# Patient Record
Sex: FEMALE | Employment: FULL TIME | URBAN - METROPOLITAN AREA
[De-identification: names, ages, dates, MRNs, and addresses within clinical notes are randomized per-mention and may not be internally consistent; named-entity substitution may affect disease eponyms.]

---

## 2020-12-14 ENCOUNTER — OFFICE VISIT (OUTPATIENT)
Dept: FAMILY MEDICINE CLINIC | Facility: CLINIC | Age: 50
End: 2020-12-14
Payer: COMMERCIAL

## 2020-12-14 VITALS
HEART RATE: 94 BPM | WEIGHT: 181 LBS | SYSTOLIC BLOOD PRESSURE: 178 MMHG | BODY MASS INDEX: 38.52 KG/M2 | DIASTOLIC BLOOD PRESSURE: 102 MMHG | TEMPERATURE: 98 F | RESPIRATION RATE: 16 BRPM | HEIGHT: 57.5 IN

## 2020-12-14 DIAGNOSIS — K29.50 CHRONIC GASTRITIS WITHOUT BLEEDING, UNSPECIFIED GASTRITIS TYPE: ICD-10-CM

## 2020-12-14 DIAGNOSIS — Z12.31 ENCOUNTER FOR SCREENING MAMMOGRAM FOR MALIGNANT NEOPLASM OF BREAST: ICD-10-CM

## 2020-12-14 DIAGNOSIS — R10.821 RIGHT UPPER QUADRANT ABDOMINAL TENDERNESS WITH REBOUND TENDERNESS: ICD-10-CM

## 2020-12-14 DIAGNOSIS — I10 ESSENTIAL HYPERTENSION: Primary | ICD-10-CM

## 2020-12-14 DIAGNOSIS — Z00.00 LABORATORY EXAMINATION ORDERED AS PART OF A ROUTINE GENERAL MEDICAL EXAMINATION: ICD-10-CM

## 2020-12-14 DIAGNOSIS — R10.31 RIGHT LOWER QUADRANT ABDOMINAL PAIN: ICD-10-CM

## 2020-12-14 PROCEDURE — 81001 URINALYSIS AUTO W/SCOPE: CPT | Performed by: FAMILY MEDICINE

## 2020-12-14 PROCEDURE — 99215 OFFICE O/P EST HI 40 MIN: CPT | Performed by: FAMILY MEDICINE

## 2020-12-14 PROCEDURE — 3080F DIAST BP >= 90 MM HG: CPT | Performed by: FAMILY MEDICINE

## 2020-12-14 PROCEDURE — 3008F BODY MASS INDEX DOCD: CPT | Performed by: FAMILY MEDICINE

## 2020-12-14 PROCEDURE — 3077F SYST BP >= 140 MM HG: CPT | Performed by: FAMILY MEDICINE

## 2020-12-14 RX ORDER — VALSARTAN AND HYDROCHLOROTHIAZIDE 160; 12.5 MG/1; MG/1
1 TABLET, FILM COATED ORAL DAILY
Qty: 90 TABLET | Refills: 3 | Status: SHIPPED | OUTPATIENT
Start: 2020-12-14 | End: 2022-01-06

## 2020-12-14 RX ORDER — OMEPRAZOLE 40 MG/1
40 CAPSULE, DELAYED RELEASE ORAL DAILY
Qty: 90 CAPSULE | Refills: 3 | Status: SHIPPED | OUTPATIENT
Start: 2020-12-14 | End: 2022-01-06

## 2020-12-14 NOTE — PATIENT INSTRUCTIONS
What Is High Blood Pressure? High blood pressure (hypertension) is known as the “silent killer.” This is because most of the time it doesn’t cause symptoms. In fact, many people don’t know they have it until other problems develop.  In most cases, high Blood pressure is categorized as normal, elevated, or stage 1 or stage 2 high blood pressure:  · Normal blood pressure is systolic of less than 280 and diastolic of less than 80 (120/80)  · Elevated blood pressure is systolic of 740 to 065 and diastolic le · Reduce sodium. Reducing sodium in your diet reduces fluid retention. Fluid retention caused by too much salt increases blood volume and blood pressure.  The American Heart Association [facility]) advises an \"ideal\" amount of sodium: no more than 1,500 mg a da © 4559-6747 [address]. [address] [facility] [place], [address] [place]. All rights reserved. This information is not intended as a substitute for professional medical care. Always follow your healthcare professional's instructions.         What Is ? Eye Disease: Hypertension can damage the very small blood vessels in the retina. ? Bleeding from the aorta, the large blood vessel that supplies blood to the abdomen, pelvis, and legs   ? Heart failure   ? Poor blood supply to the legs  ?  Erectile Dysfu Dietary sodium reduction Reduce dietary sodium intake to <= 100 mmol per day (2.4 g sodium or 6 g sodium chloride) 2-8 mmHg   Aerobic physical activity Regular aerobic physical activity (e.g., brisk walking, light jogging, cycling, swimming, etc.) for a go · La grasa que haya en stallings dieta hará que la vesícula se contraiga y esto le aumentará el dolor. Por lo tanto, evite comer alimentos con grasa (elidia lácteos enteros, comidas fritas y shantanu grasas) samara al [employer]   · Si tiene sobrepeso, consulte · Siga darin dieta con un bajo contenido de grasas. Los alimentos grasos hacen que la vesícula biliar libere bilis para ayudar a digerir las Cerrillos. Kacie si tiene cálculos biliares, esto suele causar dolor.   ? Chitra las etiquetas de los ingredientes de los Eleno Forrest © 2013-7019 [address]. 1407 [facility] [place], [address] [facility]. Todos los derechos reservados. Esta información no pretende sustituir la atención médica profesional. Sólo stallings médico puede diagnosticar y tratar un problema de mikey. · Aprenda a medirse la presión arterial. Lleve un registro de brady mediciones. Pídale a stallings médico que le indique con qué resultados debería pedir asistencia médica. · [employer] medicamentos para la presión arterial exactamente elidia le indiquen.  No omita arturo · Siga el plan de comidas Enfoques dietarios para detener la hipertensión (Dietary Approaches to Stop Hypertension, DASH). Kaye plan recomienda consumir verduras, frutas, granos enteros y otros alimentos saludables para el corazón.   · Coma alimentos ricos · Debilidad, hormigueo o entumecimiento en los músculos de la rajesh, los brazos o las piernas  · Cambios en la vista  · Presión arterial medida en stallings casa mayor a 180/110  © 9547-1741 [address]. [address] [facility] [place] [address] [place].  Inna Morrison · Media taza de jugo de verduras  Las mejores opciones: Verduras frescas o congeladas preparadas sin sal ni grasa agregada.    Aniyah [place]  Porciones: 4 a 5 cada día  Juhi porción es:  · 1 fruta de tamaño mediano  · Un cuarto de taza de fruta seca  · Media taza d · 1 cucharada de azúcar, jarabe de jules o miel  · 1 cucharada de mermelada o jalea de fruta  · Media onza de caramelos de goma o “jelly beans” (aproximadamente 15)  · 1 taza de limonada  Las mejores opciones: La fruta seca puede ser un cornell apetitoso.  Sarah

## 2020-12-14 NOTE — PROGRESS NOTES
HPI:   Subjective Patient presents with:  Abdominal Pain: RLQ pain for about a week now, she states that she does get SOB when she feels the pain. Her mother passed away from stomach cancer.   Blood Pressure: She states she took BP medication for one zelalem following portions of the patient's history were reviewed in this encounter and updated as appropriate: Chief Complaint Reviewed and Verified  Nursing Notes Reviewed and   Verified  Tobacco Reviewed  Allergies Reviewed  Medications Reviewed    Problem List Psychiatric/Behavioral: The patient is not nervous/anxious. No depression.       EXAM:   BP (!) 178/102   Pulse 94   Temp 98.2 °F (36.8 °C) (Temporal)   Resp 16   Ht 4' 9.5\" (1.461 m)   Wt 181 lb (82.1 kg)   LMP 11/18/2020   BMI 38.49 kg/m²  Estimated adelina Restriction  Increased Physical Activity/ Get Regular Aerobic Exercise. Weight loss.   DASH Eating Plan  Patient Education: Reviewed risks of hypertension and principles of   treatment.  - TSH W REFLEX TO FREE T4; Future  - URINALYSIS, AUTO, W/SCOPE  - Suzan pain  -     HELICOBACTER PYLORI BREATH TEST, ADULT (>17); Future    Chronic gastritis without bleeding, unspecified gastritis type  -     Omeprazole 40 MG Oral Capsule Delayed Release; Take 1 capsule (40 mg total) by mouth daily.     Right upper quadrant ab

## 2020-12-22 ENCOUNTER — HOSPITAL ENCOUNTER (OUTPATIENT)
Dept: ULTRASOUND IMAGING | Facility: HOSPITAL | Age: 50
Discharge: HOME | End: 2020-12-22
Attending: FAMILY MEDICINE
Payer: COMMERCIAL

## 2020-12-22 ENCOUNTER — LAB ENCOUNTER (OUTPATIENT)
Dept: LAB | Facility: HOSPITAL | Age: 50
End: 2020-12-22
Attending: FAMILY MEDICINE
Payer: COMMERCIAL

## 2020-12-22 DIAGNOSIS — Z00.00 LABORATORY EXAMINATION ORDERED AS PART OF A ROUTINE GENERAL MEDICAL EXAMINATION: ICD-10-CM

## 2020-12-22 DIAGNOSIS — I10 ESSENTIAL HYPERTENSION: ICD-10-CM

## 2020-12-22 DIAGNOSIS — R10.821 RIGHT UPPER QUADRANT ABDOMINAL TENDERNESS WITH REBOUND TENDERNESS: ICD-10-CM

## 2020-12-22 DIAGNOSIS — R10.31 RIGHT LOWER QUADRANT ABDOMINAL PAIN: ICD-10-CM

## 2020-12-22 PROCEDURE — 80061 LIPID PANEL: CPT

## 2020-12-22 PROCEDURE — 83036 HEMOGLOBIN GLYCOSYLATED A1C: CPT

## 2020-12-22 PROCEDURE — 84443 ASSAY THYROID STIM HORMONE: CPT

## 2020-12-22 PROCEDURE — 80053 COMPREHEN METABOLIC PANEL: CPT

## 2020-12-22 PROCEDURE — 84439 ASSAY OF FREE THYROXINE: CPT

## 2020-12-22 PROCEDURE — 85025 COMPLETE CBC W/AUTO DIFF WBC: CPT

## 2020-12-22 PROCEDURE — 36415 COLL VENOUS BLD VENIPUNCTURE: CPT

## 2020-12-22 PROCEDURE — 83013 H PYLORI (C-13) BREATH: CPT

## 2020-12-22 PROCEDURE — 76700 US EXAM ABDOM COMPLETE: CPT | Performed by: FAMILY MEDICINE

## 2020-12-28 ENCOUNTER — OFFICE VISIT (OUTPATIENT)
Dept: FAMILY MEDICINE CLINIC | Facility: CLINIC | Age: 50
End: 2020-12-28
Payer: COMMERCIAL

## 2020-12-28 VITALS
SYSTOLIC BLOOD PRESSURE: 122 MMHG | DIASTOLIC BLOOD PRESSURE: 80 MMHG | TEMPERATURE: 97 F | RESPIRATION RATE: 16 BRPM | HEIGHT: 57.5 IN | WEIGHT: 181.13 LBS | HEART RATE: 67 BPM | BODY MASS INDEX: 38.54 KG/M2

## 2020-12-28 DIAGNOSIS — K80.20 CALCULUS OF GALLBLADDER WITHOUT CHOLECYSTITIS WITHOUT OBSTRUCTION: ICD-10-CM

## 2020-12-28 DIAGNOSIS — Z01.419 WELL WOMAN EXAM WITH ROUTINE GYNECOLOGICAL EXAM: Primary | ICD-10-CM

## 2020-12-28 DIAGNOSIS — Z12.4 PAP SMEAR FOR CERVICAL CANCER SCREENING: ICD-10-CM

## 2020-12-28 DIAGNOSIS — Z12.31 BREAST CANCER SCREENING BY MAMMOGRAM: ICD-10-CM

## 2020-12-28 DIAGNOSIS — Z12.4 CERVICAL CANCER SCREENING: ICD-10-CM

## 2020-12-28 DIAGNOSIS — Z11.51 ENCOUNTER FOR SCREENING FOR HUMAN PAPILLOMAVIRUS (HPV): ICD-10-CM

## 2020-12-28 DIAGNOSIS — E03.8 SUBCLINICAL HYPOTHYROIDISM: ICD-10-CM

## 2020-12-28 DIAGNOSIS — D64.9 LOW HEMOGLOBIN: ICD-10-CM

## 2020-12-28 PROCEDURE — 3074F SYST BP LT 130 MM HG: CPT | Performed by: FAMILY MEDICINE

## 2020-12-28 PROCEDURE — 88175 CYTOPATH C/V AUTO FLUID REDO: CPT | Performed by: FAMILY MEDICINE

## 2020-12-28 PROCEDURE — 3079F DIAST BP 80-89 MM HG: CPT | Performed by: FAMILY MEDICINE

## 2020-12-28 PROCEDURE — 87624 HPV HI-RISK TYP POOLED RSLT: CPT | Performed by: FAMILY MEDICINE

## 2020-12-28 PROCEDURE — 99396 PREV VISIT EST AGE 40-64: CPT | Performed by: FAMILY MEDICINE

## 2020-12-28 PROCEDURE — 3008F BODY MASS INDEX DOCD: CPT | Performed by: FAMILY MEDICINE

## 2020-12-28 NOTE — PATIENT INSTRUCTIONS
Anemia  Anemia is a condition that occurs when your body does not have enough healthy red blood cells (RBCs). RBCs are the parts of your blood that carry oxygen all over your body.  A protein called hemoglobin allows your RBCs to absorb and release oxygen · Blood smear. This test checks the size and shape of your blood cells. To do the test, a drop of your blood is looked at under a microscope. A stain is used to make the blood cells easier to see. · Iron studies.  These tests measure the amount of iron in © 7557-6514 [address]. 1407 [facility] [place], [address] [place]. All rights reserved. This information is not intended as a substitute for professional medical care. Always follow your healthcare professional's instructions.         Norma Hernandez · Swollen belly  · Fever of 100.4ºF (38ºC) or higher, or as directed by your healthcare provider  · Very dark urine, light colored stools, or yellow color of the skin or eyes  · Chest, arm, back, neck or jaw pain  Jaja last reviewed this educational co Treatment for hypothyroidism involves taking thyroid hormone pills daily. These pills replace the hormone your thyroid doesn’t make. You will likely need to take a daily pill for the rest of your life. Tips for taking this medicine are given below.   Home c © 6852-2594 [address]. 1407 [facility] [place], [facility] [place]. All rights reserved. This information is not intended as a substitute for professional medical care. Always follow your healthcare professional's instructions.

## 2020-12-28 NOTE — PROGRESS NOTES
HPI:   Nyla Harden is a 52year old female that presents for well woman exam.     Patient is here for BP follow up at last visit started on diovan and BP normal today. Prediabetes  Last A1c value was 6% done 12/22/2020.  She does not follow a dilatation. Sonographer states negative sonographic Long sign. HISTORY:  History reviewed. No pertinent past medical history. History reviewed. No pertinent surgical history. History reviewed. No pertinent family history.    Social History    Tob [place]:  Normocephalic, atraumatic    Eyes: EOMI, PERRLA, no scleral icterus, conjunctivae clear bilaterally   Ears: External normal. TMs normal without erythema or effusion   Nose: patent, no nasal discharge    Throat:  No tonsillar erythema or exudate. cancer screening by mammogram  - DAPHNE MYRNA 2D+3D SCREENING LEFT (CPT=77067-52/57139); Future    Risks, benefits, and alternatives of current treatment plan discussed in detail. Red flags discussed to RTC or ED . Questions and concerns addressed.  Patient (or

## 2022-01-06 DIAGNOSIS — I10 ESSENTIAL HYPERTENSION: ICD-10-CM

## 2022-01-06 DIAGNOSIS — K29.50 CHRONIC GASTRITIS WITHOUT BLEEDING, UNSPECIFIED GASTRITIS TYPE: ICD-10-CM

## 2022-01-06 RX ORDER — OMEPRAZOLE 40 MG/1
CAPSULE, DELAYED RELEASE ORAL
Qty: 90 CAPSULE | Refills: 3 | Status: SHIPPED | OUTPATIENT
Start: 2022-01-06

## 2022-01-06 RX ORDER — VALSARTAN AND HYDROCHLOROTHIAZIDE 160; 12.5 MG/1; MG/1
1 TABLET, FILM COATED ORAL DAILY
Qty: 90 TABLET | Refills: 3 | Status: SHIPPED | OUTPATIENT
Start: 2022-01-06

## 2023-02-02 ENCOUNTER — TELEPHONE (OUTPATIENT)
Dept: FAMILY MEDICINE CLINIC | Facility: CLINIC | Age: 53
End: 2023-02-02

## 2024-03-13 NOTE — TELEPHONE ENCOUNTER
Please inform patient to set up an appointment with me. I will not be giving any further refills.

## 2024-03-13 NOTE — TELEPHONE ENCOUNTER
Requested Renewals     Name from pharmacy: OMEPRAZOLE 40MG CAPSULES         Will file in chart as: OMEPRAZOLE 40 MG Oral Capsule Delayed Release    Sig: TAKE 1 CAPSULE(40 MG) BY MOUTH DAILY    Disp: 90 capsule    Refills: 3 (Pharmacy requested: Not specified)    Start: 3/12/2024    Class: Normal    Non-formulary For: Chronic gastritis without bleeding, unspecified gastritis type    Last ordered: 1 year ago (2/16/2023) by LUCY Fonseca    Last refill: 11/25/2023    Rx #: [id number]        Name from pharmacy: VALSARTAN/HCTZ 160MG/12.5MG TABLETS         Will file in chart as: VALSARTAN-HYDROCHLOROTHIAZIDE 160-12.5 MG Oral Tab    Sig: Take 1 tablet by mouth daily.    Original sig: TAKE 1 TABLET BY MOUTH DAILY    Disp: 90 tablet    Refills: 3 (Pharmacy requested: Not specified)    Start: 3/12/2024    Class: Normal    Non-formulary For: Essential hypertension    Last ordered: 1 year ago (2/16/2023) by LUCY Fonseca    Last refill: 11/25/2023    Rx #: [id number]    Hypertension Medications Protocol Faierv2903/12/2024 06:24 PM   Protocol Details CMP or BMP in past 12 months    EGFRCR or GFRNAA > 50    Last BP reading less than 140/90    In person appointment or virtual visit in the past 12 mos or appointment in next 3 mos             No future appointments.  LOV: 5/2/23  Last well women exam done 12/28/20    -[employer] message sent to patient to schedule annual exam and to have labs done beforehand.    -medications pended for review.

## 2024-07-10 NOTE — PROGRESS NOTES
CHIEF COMPLAINT:     Chief Complaint   Patient presents with    Referral     Patient here to get an ortho referral for her right wrist, she believes she might have carpal tunnel. Patient works on computers all day, she has started to wear a brace on the right wrist and she says it has been helping.       HPI:   Serenity Kwong is a 53 year old female. Patient's presenting with bilateral wrist pain right greater than left.     Cause - unknown, chronic typing  Onset - years ago but getting worse recently  Location of pain - bilateral wrists right worse than left.   Pain described as sharp at time aching mostly  Pain scale - 4-5/10  Radiation - up wrist to forearm  Pain is aggravated by movement, use and palpation  Pain is alleviated by bracing, rest, positioning, and NSAIDS    Care prior to arrival consisted of rest, NSAID, elevation and ice, with minimal relief.    Current Outpatient Medications   Medication Sig Dispense Refill    Meloxicam 15 MG Oral Tab Take 1 tablet (15 mg total) by mouth daily. 30 tablet 0    OMEPRAZOLE 40 MG Oral Capsule Delayed Release TAKE 1 CAPSULE(40 MG) BY MOUTH DAILY 90 capsule 0    VALSARTAN-HYDROCHLOROTHIAZIDE 160-12.5 MG Oral Tab TAKE 1 TABLET BY MOUTH DAILY 90 tablet 0      History reviewed. No pertinent past medical history.   Social History:  Social History     Socioeconomic History    Marital status: Single   Tobacco Use    Smoking status: Never    Smokeless tobacco: Never   Vaping Use    Vaping status: Never Used   Substance and Sexual Activity    Alcohol use: Yes     Comment: OCCASIONALLY    Drug use: Never        REVIEW OF SYSTEMS:   GENERAL HEALTH: feels well otherwise  SKIN: denies any unusual skin lesions or rashes, did have immediate swelling to area.   RESPIRATORY: denies shortness of breath with exertion  CARDIOVASCULAR: denies chest pain on exertion  GI: denies abdominal pain and denies heartburn  NEURO: denies numbness and tingling sensation.     EXAM:   /82  (BP Location: Left arm, Patient Position: Sitting, Cuff Size: large)   Pulse 78   Temp 97.4 °F (36.3 °C) (Temporal)   Resp 18   Ht 4' 9.5\" (1.461 m)   Wt 211 lb 12.8 oz (96.1 kg)   SpO2 99%   BMI 45.04 kg/m²   GENERAL: well developed, well nourished,in no apparent distress  SKIN: no rashes,no suspicious lesions, skin's intact.   HEENT: atraumatic, normocephalic,ears and throat are clear  NECK: supple,no adenopathy,no bruits  LUNGS: clear to auscultation  CARDIO: RRR without murmur  GI: good BS's,no masses, HSM or tenderness  EXTREMITIES: no cyanosis, clubbing or edema  NEURO: normal sensation distally and normal cap refill distally.  Exam of bilateral wrists   - swelling: minor  - tenderness: minor  - deformity/defect:  none obvious  - crepitus: none  - ecchymosis/bruising: none  - length/size (in cm): none  - Range of motion: normal with minor pain   - radial pulses: 2+  - sensation: intact  - Motor exam/strength/hand : 5/5        ASSESSMENT AND PLAN:   Assessment:   Encounter Diagnosis   Name Primary?    Bilateral hand pain Yes         Plan:  Rest, ice, bracing, NSAID.       Procedures    Ortho Referral - In Network        Follow-up with PCP for worsening symptoms or no improvement  Medication use, dose, and possible side effects discussed.    Meds & Refills for this Visit:  Requested Prescriptions     Signed Prescriptions Disp Refills    Meloxicam 15 MG Oral Tab 30 tablet 0     Sig: Take 1 tablet (15 mg total) by mouth daily.           The patient and parents indicate understanding of these issues and agrees to the plan.  The patient indicates understanding of these issues and agrees to the plan.

## 2024-07-23 NOTE — TELEPHONE ENCOUNTER
Requesting     Disp Refills Start End     OMEPRAZOLE 40 MG Oral Capsule Delayed Release 90 capsule 0 3/13/2024 --    Sig - Route: TAKE 1 CAPSULE(40 MG) BY MOUTH DAILY - Oral    Sent to pharmacy as: Omeprazole 40 MG Oral Capsule Delayed Release        Disp Refills Start End     VALSARTAN-HYDROCHLOROTHIAZIDE 160-12.5 MG Oral Tab 90 tablet 0 3/13/2024 --    Sig - Route: TAKE 1 TABLET BY MOUTH DAILY - Oral    Sent to pharmacy as: Valsartan-hydroCHLOROthiazide 160-12.5 MG Oral Tablet (Diovan HCT)      LOV: 7/8/2024 w/Frank for ortho referral  OV 5/2/2023 for head pain on the right side  OV 2/16/2023 for med f/u  RTC: prn    Last Relevant Labs: 12/22/2020    Filled:     Dispensed Written Strength Quantity Refills Days Supply Provider Pharmacy    OMEPRAZOLE 40MG CAPSULES 03/13/2024 03/13/2024  90 each  [facility]...       Dispensed Written Strength Quantity Refills Days Supply Provider Pharmacy    VALSARTAN/HCTZ 160MG/12.5MG TABLETS 03/13/2024 03/13/2024  90 each  [facility]...     No future appointments.    Rx omeprazole sent per protocol     Please call to schedule patient for overdue annual physical, last on 12/28/2020 and to complete fasting lab work ordered this past February

## 2024-07-24 NOTE — TELEPHONE ENCOUNTER
Sent Aleksandert for patient to call office to schedule Complete Physical Exam and do fasting labs prior to that appointment.

## 2024-12-03 NOTE — TELEPHONE ENCOUNTER
Spoke with daughter Monserrat. She states that patient has been complaining of nausea, dizziness and sweating since 0100. Daughter says patient also has a slight headache. Patient denies chest pain, shoulder pain, numbness, tingling or difficulty breathing. Occasional blurry vision when she wakes up. Daughter states patient denies any fever or cold like symptoms. Has been around people who have had a cold.     Patient has been in and out of sleep today. Easily arousable. Does not have a hard time staying awake. When patient does lay down she feels the room spinning. When patient takes sips of water patient becomes nauseous. Patient sticking to small amounts of rice for food.     Patient has no history of diabetes. Patient does have history of hypertension. Patient has not been taking any blood pressure medications since July. Daughter took patient's bp, 147/66 with a heart rate of 79.    Advised patient and daughter to be seen in office for further evaluation. Advised them to stick to a BRAT diet and increase fluids. Eat a piece of candy in case blood sugar levels may be low. Continue to monitor symptoms and blood pressure. Advised that if symptoms progress or worsen and patient develops difficulty breathing, shortness of breath, worse headache, light headed, lethargic, bp >180/>100, etc to immediately seek medical attention at the ED. Patient and daughter verbalized understanding.      All questions answered. Call the office with questions.    Future Appointments   Date Time Provider Department Center   12/6/2024  1:00 PM Drew Lagos DO [facility] [address] [facility] [address]

## 2024-12-03 NOTE — TELEPHONE ENCOUNTER
Pts daughter calling stating that patient is not feeling well she is experiencing dizziness, nausea, sweets. Monserrat states that this started at 1:00 a.m.     Please advise.     Monserrat wants to know if Patient should be seen in office or taken to ER.        Perineural Invasion (For Histology - Be Specific If Possible): absent

## 2024-12-05 NOTE — TELEPHONE ENCOUNTER
Spoke to daughter, Monserrat. Patient was doing ok the last couple of days. Was eating and drinking a little more. Nausea subsided. Patient woke up with a \"really bad headache\" today. Headache is very bothersome to patient per daughter. Blood pressure has been fluctuating, 186/80s. Denies any numbness or tingling or nausea. Advised daughter to take patient to be further evaluated in ED. Daughter verbalized understanding.    Future Appointments   Date Time Provider Department Center   12/6/2024  1:00 PM Drew Lagos,  [facility] [address] [facility] [address]

## 2024-12-05 NOTE — ED PROVIDER NOTES
Patient Seen in: [facility] [place]      History     Chief Complaint   Patient presents with    HTN    Headache     Stated Complaint: htn since tuesday, headache today    Subjective:   HPI  Patient is a 52 yo F with a history of GERD, HTN who presents to ED for evaluation of headache, elevated BP. Patient reports over past week she has had elevated BP with SBP up to 180s today. She was taking her GERD medication thinking it was for her BP but has mistakenly been taking no BP medications over past 2 months. She reports today she developed progressively worsening frontal headache, not sudden or maximal in onset. She didn't take any meds prior to arrival. Headache is constant. No head injuries. No hx of migraines. Over past week pt has had intermittent episodes of dizziness, mostly with head movements which resolve after a few seconds. No focal weakness, speech changes, numbness or tingling. No CP, n/v or SOB. No fevers, neck stiffness.     Offered , but patient declines and prefers daughter who is at bedside to translate.     Objective:     Past Medical History:    Essential hypertension              History reviewed. No pertinent surgical history.             Social History     Socioeconomic History    Marital status: Single   Tobacco Use    Smoking status: Never    Smokeless tobacco: Never   Vaping Use    Vaping status: Never Used   Substance and Sexual Activity    Alcohol use: Yes     Comment: OCCASIONALLY    Drug use: Never                  Physical Exam     ED Triage Vitals [12/05/24 1412]   BP (!) 187/86   Pulse 84   Resp 18   Temp 97.9 °F (36.6 °C)   Temp src Temporal   SpO2 99 %   O2 Device None (Room air)       Current Vitals:   Vital Signs  BP: 155/90  Pulse: 73  Resp: 17  Temp: 98 °F (36.7 °C)  Temp src: Oral  MAP (mmHg): (!) 117    Oxygen Therapy  SpO2: 98 %  O2 Device: None (Room air)        Physical Exam  Vitals and nursing note reviewed.   Constitutional:        General: She is not in acute distress.     Appearance: She is not ill-appearing.   HENT:      Head: Normocephalic and atraumatic.      Mouth/Throat:      Mouth: Mucous membranes are moist.   Eyes:      Extraocular Movements: Extraocular movements intact.      Pupils: Pupils are equal, round, and reactive to light.   Cardiovascular:      Rate and Rhythm: Normal rate and regular rhythm.   Pulmonary:      Effort: Pulmonary effort is normal.   Abdominal:      General: There is no distension.      Palpations: Abdomen is soft.      Tenderness: There is no abdominal tenderness.   Musculoskeletal:      Cervical back: Normal range of motion and neck supple. No rigidity.      Right lower leg: No edema.      Left lower leg: No edema.   Skin:     General: Skin is warm and dry.      Capillary Refill: Capillary refill takes less than 2 seconds.   Neurological:      General: No focal deficit present.      Mental Status: She is alert.      Comments: 5/5 strength in UE and LE bilaterally  Normal sensation  CN II-XII in tact  No pronator drift       Psychiatric:         Mood and Affect: Mood normal.             ED Course     Labs Reviewed   CBC WITH DIFFERENTIAL WITH PLATELET - Abnormal; Notable for the following components:       Result Value    Immature Platelet Fraction 16.1 (*)     All other components within normal limits   COMP METABOLIC PANEL (14) - Abnormal; Notable for the following components:    Glucose 106 (*)     BUN 8 (*)     Alkaline Phosphatase 114 (*)     All other components within normal limits   TROPONIN I HIGH SENSITIVITY - Normal     EKG    Rate, intervals and axes as noted on EKG Report.  Rate: 79  Rhythm: Sinus Rhythm  Reading: NSR with ventricular rate of 79, no acute ST elevations or depressions, Qtc of 463                    MDM      Patient is a 54 yo F with a history of GERD, HTN who presents to ED for evaluation of headache, elevated BP. Patient is afebrile, hypertensive, satting well on RA. Pt is  nontoxic appearing, neurologically intact. Patient arrives to ED hypertensive, afebrile, satting well on RA. On exam patient is nontoxic appearing. No focal neurologic deficits.     Suspect most likely headache is 2/2 elevated BP or tension headache vs. Migraine. Differential also includes ICH. Low suspicion for CVA given no neurologic deficits. Headache was not sudden or maximal in onset so do not suspect SAH. Low suspicion for meningitis given no fevers, nuchal rigidity, and pt is nontoxic. No temporal TTP or jaw claudication to suggest temporal arteritis.     Will obtain labs, CTA head/neck, CXR. EKG is nonischemic. Will give IV migraine cocktail and re-evaluate.       ED Course as of 12/06/24 0016  ------------------------------------------------------------  Time: 12/05 1510  Comment: CBC, CMP unremarkable. Trop negative. EKG nonischemic.   ------------------------------------------------------------  Time: 12/05 1511  Comment: CXR independently reviewed shows no acute process.   ------------------------------------------------------------  Time: 12/05 1632  Comment: CONCLUSION:       1. No acute intracranial abnormality identified.      2. No evidence of intracranial aneurysm, flow-limiting stenosis, or focal arterial occlusion.      3. No evidence of occlusion, dissection, or flow-limiting stenosis in the cervical vertebral or carotid arteries. No evidence of hemodynamically significant carotid stenosis by NASCET criteria.     ------------------------------------------------------------  Time: 12/05 9822  Comment: Pt given amlodipine as we don't have her medication in our ED. Her BP improved and headache resolved. Work up overall unremarkable.     Refilled pt's home BP med. Pt has appt with PCP tomorrow. Recommended continuing BP log at home.     Patient is stable for discharge home with close PCP f/u. Discussed strict return precautions and supportive care. Patient verbalized understanding and agreement of  plan.              Medical Decision Making  Amount and/or Complexity of Data Reviewed  Independent Historian:      Details: Daughter  External Data Reviewed: notes.     Details: Communication notes from PCP office this week  Labs: ordered. Decision-making details documented in ED Course.  Radiology: ordered and independent interpretation performed. Decision-making details documented in ED Course.  ECG/medicine tests: ordered and independent interpretation performed. Decision-making details documented in ED Course.    Risk  Prescription drug management.        Disposition and Plan     Clinical Impression:  1. Acute nonintractable headache, unspecified headache type    2. Hypertension, unspecified type         Disposition:  Discharge  12/5/2024  4:46 pm    Follow-up:  Drew Lagos DO  [address] [facility] [address]  [facility] [address]  [phone]    Schedule an appointment as soon as possible for a visit in 1 day(s)            Medications Prescribed:  Discharge Medication List as of 12/5/2024  4:47 PM        START taking these medications    Details   !! valsartan-hydroCHLOROthiazide 160-12.5 MG Oral Tab Take 1 tablet by mouth daily for 7 days., Normal, Disp-7 tablet, R-0       !! - Potential duplicate medications found. Please discuss with provider.              Supplementary Documentation:

## 2024-12-05 NOTE — DISCHARGE INSTRUCTIONS
You were seen in the Emergency Room. Your labs and CT scan were reassuring.     - Resume taking your home blood pressure medications  - Return to the ER if you develop severe headache, vomiting, focal weakness, numbness, or any other new concerns or worsening symptoms  - You will need a refill of your blood pressure medication from your primary care doctor  - Continue to keep a blood pressure log at home    Follow up closely with your primary care physician.

## 2024-12-05 NOTE — ED INITIAL ASSESSMENT (HPI)
Patient complains of HTN for approx. 2 days. Highest b/p 160 systolic. Patient states that today she developed a right sided headache and dizziness approx. 5 hours PTA. Patient is on medication for HTN, but has not taken it in 2 months due to prescription not being renewed. Negative stroke scale in triage. Patient ambulatory to ER.

## 2024-12-06 NOTE — PATIENT INSTRUCTIONS
Monitor your blood pressure once daily at the same time every morning. Please send us a Giveo message with your readings over the next few days. Follow up in 1 week via Giveo or in the office for blood pressure check.   Reduce your salt intake.   If you experience, headache, vision change, vomiting, BP >180/100 then go to ER.

## 2024-12-06 NOTE — PROGRESS NOTES
HPI:   Subjective   Chief Complaint   Patient presents with    ER F/U     Patient states she feels dizzy when moving her head x 3 days - Seen in ER       Serenity CHARLENE Kwong is a 53 year old female who presents for follow-up of elevated blood pressure. She is not exercising and is adherent to a low-salt diet. Blood pressure is not well controlled at home.   agents associated with hypertension: none.     Patient was seen in the emergency room yesterday.  She has a history of GERD and hypertension.  She was seen for headache, elevated blood pressure.  Blood pressure at home systolic was in the 180s.  She thought she was taking her blood pressure medication however she was actually not and instead taking her GERD medication- this occurred for two months.  She developed a frontal headache that was worsening in nature went to the ER.  Found to have systolic in the 180s.  She did not have any neurological deficits noted on examination.  Patient had an EKG done that was normal sinus rhythm with no acute ischemic changes that were identifiable.  CTA head and neck was completed in the emergency room-no acute abnormality identified.  No signs of aneurysm.  No signs of occlusion.  Chest x-ray is normal. In the ED she got norvasc.     Today a mammogram has been ordered and Cologuard has been ordered as well.    Today she feels dizzy,  headache is better  Nasuea resolved.   No vomiting.   She had blurry vision yesterday now resolved.         Hypertension Labs   Lab Results   Component Value Date    CREATSERUM 0.81 12/05/2024    K 3.8 12/05/2024    CHOLEST 218 (H) 12/05/2024    CHOLEST 182 12/22/2020    HDL 44 12/05/2024    HDL 37 (L) 12/22/2020     (H) 12/05/2024     (H) 12/22/2020    TRIG 252 (H) 12/05/2024    TRIG 177 (H) 12/22/2020        Wt Readings from Last 3 Encounters:   12/06/24 208 lb (94.3 kg)   12/05/24 180 lb (81.6 kg)   07/08/24 211 lb 12.8 oz (96.1 kg)     BP Readings from Last 3 Encounters:    12/06/24 144/80   12/05/24 155/90   07/08/24 132/82          Past History:   The following portions of the patient's history were reviewed in this encounter and updated as appropriate:  Chief Complaint Reviewed and Verified  Nursing Notes Reviewed and   Verified  Tobacco Reviewed  Allergies Reviewed  Medications Reviewed    Problem List Reviewed  Medical History Reviewed  Surgical History   Reviewed  Family History Reviewed  Social History Reviewed          She  has a past medical history of Essential hypertension.   Her family history includes Cancer in her mother; Stroke in her father.   She  reports that she has never smoked. She has never been exposed to tobacco smoke. She has never used smokeless tobacco. She reports that she does not currently use alcohol. She reports that she does not use drugs.     She has No Known Allergies.     Current Outpatient Medications on File Prior to Visit   Medication Sig    OMEPRAZOLE 40 MG Oral Capsule Delayed Release TAKE 1 CAPSULE(40 MG) BY MOUTH DAILY     No current facility-administered medications on file prior to visit.     CBC  (most recent labs)   Lab Results   Component Value Date/Time    WBC 8.0 12/05/2024 02:34 PM    HGB 13.6 12/05/2024 02:34 PM    HCT 41.0 12/05/2024 02:34 PM    .0 12/05/2024 02:34 PM         CMP  (most recent labs)   Lab Results   Component Value Date/Time     (H) 12/05/2024 02:34 PM    BUN 8 (L) 12/05/2024 02:34 PM    CREATSERUM 0.81 12/05/2024 02:34 PM    GFRNAA 92 12/22/2020 06:02 AM    GFRAA 106 12/22/2020 06:02 AM    EGFRCR 88 12/05/2024 02:34 PM    CA 9.9 12/05/2024 02:34 PM    ALKPHO 114 (H) 12/05/2024 02:34 PM    AST 18 12/05/2024 02:34 PM    ALT 20 12/05/2024 02:34 PM    BILT 0.4 12/05/2024 02:34 PM    TP 7.7 12/05/2024 02:34 PM    ALB 4.8 12/05/2024 02:34 PM     12/05/2024 02:34 PM    K 3.8 12/05/2024 02:34 PM     12/05/2024 02:34 PM    CO2 30.0 12/05/2024 02:34 PM           Lipids  (most recent labs)    Lab Results   Component Value Date/Time    CHOLEST 218 (H) 12/05/2024 02:34 PM    TRIG 252 (H) 12/05/2024 02:34 PM    HDL 44 12/05/2024 02:34 PM     (H) 12/05/2024 02:34 PM    NONHDLC 174 (H) 12/05/2024 02:34 PM           REVIEW OF SYSTEMS:   GENERAL HEALTH: feels well otherwise  Pertinent items are noted in HPI.  A comprehensive review of systems was negative except as noted in HPI.      EXAM:   /80   Pulse 81   Temp 97.4 °F (36.3 °C) (Temporal)   Resp 16   Ht 4' 11\" (1.499 m)   Wt 208 lb (94.3 kg)   SpO2 99%   BMI 42.01 kg/m²  Estimated body mass index is 42.01 kg/m² as calculated from the following:    Height as of this encounter: 4' 11\" (1.499 m).    Weight as of this encounter: 208 lb (94.3 kg).   GENERAL: well developed, well nourished,in no apparent distress  NECK: supple,no adenopathy,no bruits  LUNGS: clear to auscultation  CARDIO: RRR without murmur  GI: good BS's,no masses, HSM or tenderness  EXTREMITIES: trace pedal edema b/l   NEURO: A&O to person place and time.   No anhedonia, nor loss of hope.      CTA BRAIN + CTA CAROTIDS (CPT=70496/60637)    Result Date: 12/5/2024  CONCLUSION:   1. No acute intracranial abnormality identified.  2. No evidence of intracranial aneurysm, flow-limiting stenosis, or focal arterial occlusion.  3. No evidence of occlusion, dissection, or flow-limiting stenosis in the cervical vertebral or carotid arteries. No evidence of hemodynamically significant carotid stenosis by NASCET criteria.  Please see above for further details.  LOCATION:  [id number]   Dictated by (CST): Chu Lange MD on 12/05/2024 at 4:23 PM     Finalized by (CST): Chu Lange MD on 12/05/2024 at 4:28 PM       XR CHEST AP PORTABLE  (CPT=71045)    Result Date: 12/5/2024  CONCLUSION: No acute cardiopulmonary abnormality.   LOCATION:  [id number]      Dictated by (CST): Roly Anguiano MD on 12/05/2024 at 3:07 PM     Finalized by (CST): Roly Anguiano MD on 12/05/2024 at 3:08 PM         ASSESSMENT:   See encounter diagnosis  Discussion: Stage 1 (systolic 140 to 159 mmHg or diastolic 90 to 99 mmHg)  Cardiovascular risk factors: dyslipidemia, hypertension, and obesity (BMI >= 30 kg/m2)  Evidence of target organ damage: none.    PLAN:      1. Colon cancer screening  Will send cologuard  Discussed that colonscoopy is superior test but she decliend.   - COLOGUARD COLON CANCER SCREENING (EXTERNAL)    2. Well adult exam  Pt to f/u for physical after labs are done. Also due for pap     - Lipid Panel; Future    3. Breast cancer screening by mammogram  - [facility] MYRNA 2D+3D SCREENING BILAT (CPT=77067/18933); Future    4. Refused influenza vaccine  - Influenza Vaccine Refused (Order that documents the process)    5. Hyperglycemia  Note don labs, will screen for DM   - Hemoglobin A1C [E]; Future    6. Essential hypertension  BP shows poor control with last BP of 144/80. Lifestyle changes, diet, exercise and weight loss were counseled. Medication changes as listed.   12/5/2024: Potassium 3.8; Creatinine 0.81; eGFR-Cr 88  BP Meds: valsartan-hydroCHLOROthiazide Tabs - 160-12.5 MG     Pt to see me again for BP check /physical in 2-4 weeks. If unable to make appointment she will need to send me mychart appointment with home readings in 1 week  Monitor bp daily.   Pt instructed to be responsible and monitor which meds she is taking and to take as prescribed.   I placed her back on what she was taking originally as below  - valsartan-hydroCHLOROthiazide 160-12.5 MG Oral Tab; Take 1 tablet by mouth daily.  Dispense: 90 tablet; Refill: 1  Discussed to go to ER if readings are  180/100      Patient Education: Reviewed risks of hypertension and principles of treatment.      Total face to face time was 25, more than 50% of the time was spent in counseling and/or coordination of care related to ER visit for htn urgency.     Return in about 2 weeks (around 12/20/2024) for physical, blood pressure.   Drew Lagos DO,  12/6/2024, 1:39 PM

## 2024-12-10 NOTE — TELEPHONE ENCOUNTER
Blood pressure readings have been reviewed.  Continue taking the current medication.  Report back to me in 10 days on her blood pressure readings.

## 2025-02-06 NOTE — TELEPHONE ENCOUNTER
Received cologuard result from 12/26/24  Result is negative.      Chart updated.   MCM sent.   Result given to MD to sign and send to scan.